# Patient Record
Sex: FEMALE | Race: WHITE | NOT HISPANIC OR LATINO | URBAN - METROPOLITAN AREA
[De-identification: names, ages, dates, MRNs, and addresses within clinical notes are randomized per-mention and may not be internally consistent; named-entity substitution may affect disease eponyms.]

---

## 2018-10-26 ENCOUNTER — EMERGENCY (EMERGENCY)
Facility: HOSPITAL | Age: 63
LOS: 1 days | Discharge: HOME | End: 2018-10-26
Admitting: EMERGENCY MEDICINE
Payer: COMMERCIAL

## 2018-10-26 VITALS
TEMPERATURE: 98 F | OXYGEN SATURATION: 100 % | DIASTOLIC BLOOD PRESSURE: 73 MMHG | HEART RATE: 85 BPM | SYSTOLIC BLOOD PRESSURE: 144 MMHG | RESPIRATION RATE: 16 BRPM

## 2018-10-26 DIAGNOSIS — F20.9 SCHIZOPHRENIA, UNSPECIFIED: ICD-10-CM

## 2018-10-26 DIAGNOSIS — R69 ILLNESS, UNSPECIFIED: ICD-10-CM

## 2018-10-26 DIAGNOSIS — F41.1 GENERALIZED ANXIETY DISORDER: ICD-10-CM

## 2018-10-26 DIAGNOSIS — F43.20 ADJUSTMENT DISORDER, UNSPECIFIED: ICD-10-CM

## 2018-10-26 PROCEDURE — 99283 EMERGENCY DEPT VISIT LOW MDM: CPT

## 2018-10-26 PROCEDURE — 90792 PSYCH DIAG EVAL W/MED SRVCS: CPT

## 2018-10-26 NOTE — ED BEHAVIORAL HEALTH ASSESSMENT NOTE - SUMMARY
Patient is a 62 year old single retired non-caregiver  female currently residing in the [facility]. PPH JOSH & Schizophrenia. She has a history of multiple past inpatient admissions last in 2016. She has no history of suicide attempts, aggression, violence, legal issues or substance abuse problems. PMH includes osteoporosis and hypothyroidism. BIB cousin for depression. Patient is a 62 year old single retired non-caregiver  female currently residing in the [facility]. PPH JOSH & Schizophrenia. She has a history of multiple past inpatient admissions last in 2016 at [facility]. She has no history of suicide attempts, aggression, violence, legal issues or substance abuse problems. PMH includes osteoporosis and hypothyroidism. BIB cousin for medication evaluation.     Patient presents to the ER in the context of medication evaluation. Cousin and facility expressed concern because patient refused to wash her hair x 5 weeks until this past Monday and has been isolative. Patient recently changed assisted livings. Per past notes of psychosocial assessment "patient transferred from another facility where she resided for 1.9 years and had difficulty socializing initially."  She did comply with facility request to wash her hair and today presents well groomed and clean. Patient has a linear organized thought process and does not appear acutely psychotic, manic or depressed. Patient denies SI/HI/SIB/intent/plan or any other mental health issues. She is medication compliant and lives in a supervised residence. She is eating and sleeping. She is future oriented and able to safety plan. She refused inpatient psychiatric admission and does not meet criteria for involuntary inpatient admission. Patient's behavior possibly related to adjustment of new residence or chronic negative symptoms which are commonly associated with schizophrenia and have limited potential for improvement. Recommend discharge back to facility and follow up with outpatient provider. Family can consider obtaining aide for patient to help encouragement of ADLs and participation in the community. Extensive safety planning performed. Patient and family agreeing verbally to return patient to ER or call 911 if symptoms worsen or patient has urges to harm self or others.

## 2018-10-26 NOTE — ED PROVIDER NOTE - CARE PLAN
Principal Discharge DX:	Adjustment disorder  Secondary Diagnosis:	Schizophrenia  Secondary Diagnosis:	JOSH (generalized anxiety disorder)

## 2018-10-26 NOTE — ED BEHAVIORAL HEALTH ASSESSMENT NOTE - CURRENT MEDICATION
Haldol Dec 200mg IM given 10/16/18  Cogentin 0.5mg BID  Celexa 20mg daily  Synthroid 25mcg daily  fosamax 70mg/week Haldol Dec 200mg IM given 10/16/18  Cogentin 0.5mg BID  Celexa (dose unknown) daily  Synthroid 25mcg daily  fosamax 70mg/week

## 2018-10-26 NOTE — ED BEHAVIORAL HEALTH ASSESSMENT NOTE - OTHER
Caroline,  family and facility peers lives in supervised living facility chronic mental illness recent change in living facility

## 2018-10-26 NOTE — ED PROVIDER NOTE - OBJECTIVE STATEMENT
63 y/o F  hx HTN,HLD, Depression presents to  ER w c/o intermittent anxiousness, requesting change in medication regimen.  Denies falling, punching or kicking any objects. Denies pain, SOB, fever, chills, chest/ abdominal discomfort.  Denies HI/AH/VH. Denies recent use of alcohol or illicit drugs. Admits to medication compliance. 63 y/o F  hx  JOSH, Schizophrenia, Osteoporosis, Hypothyroidism,  Depression presents to  ER w c/o intermittent anxiousness, requesting change in medication regimen. Admits to felling sad frequently, stays in her room and not wanting  to mingle with other resident at her facility.   Denies falling, punching or kicking any objects. Denies pain, SOB, fever, chills, chest/ abdominal discomfort.  Denies SI/HI/AH/VH. Denies recent use of alcohol or illicit drugs. Admits to medication compliance.

## 2018-10-26 NOTE — ED BEHAVIORAL HEALTH ASSESSMENT NOTE - OTHER PAST PSYCHIATRIC HISTORY (INCLUDE DETAILS REGARDING ONSET, COURSE OF ILLNESS, INPATIENT/OUTPATIENT TREATMENT)
PPH JOSH & Schizophrenia. She has a history of multiple past inpatient admissions last in 2016. She has no history of suicide attempts. Was at [facility] from 11/21/16-10/17/18. Currently sees facility psychiatrist Dr. Hernandez ([phone]).    Per past psychosocial history- "patient transferred from another facility where she resided for 1.9 years and had difficulty socializing initially." PPH JOSH & Schizophrenia. She has a history of multiple past inpatient admissions last in 2016 at [facility]. She has no history of suicide attempts. Was at [facility] from 11/21/16-10/17/18. Currently sees facility psychiatrist Dr. Hernandez ([phone]).    Per past psychosocial history- "patient transferred from another facility where she resided for 1.9 years and had difficulty socializing initially."

## 2018-10-26 NOTE — ED ADULT NURSE REASSESSMENT NOTE - NS ED NURSE REASSESS COMMENT FT1
Break Coverage - Pt calm & cooperative denies Si/Hi/AVh presently safety & comfort measures maintained eval on going.

## 2018-10-26 NOTE — ED ADULT NURSE NOTE - NSIMPLEMENTINTERV_GEN_ALL_ED
Implemented All Universal Safety Interventions:  [place] to call system. Call bell, personal items and telephone within reach. Instruct patient to call for assistance. Room bathroom lighting operational. Non-slip footwear when patient is off stretcher. Physically safe environment: no spills, clutter or unnecessary equipment. Stretcher in lowest position, wheels locked, appropriate side rails in place.

## 2018-10-26 NOTE — ED BEHAVIORAL HEALTH ASSESSMENT NOTE - SUICIDE PROTECTIVE FACTORS
Future oriented/Other/Identifies reasons for living/Responsibility to family and others/Supportive social network or family/High frustration tolerance/Positive therapeutic relationships/Ability to cope with stress

## 2018-10-26 NOTE — ED BEHAVIORAL HEALTH NOTE - BEHAVIORAL HEALTH NOTE
Called the [facility] in [place] ([phone]) and spoke Anum Gee-  Patient is currently on Haldol and gets a shot 1x a month and sees a psychiatrist. Patient has past psychiatric behaviors. Patient's niece and psychiatric physician thought it would be a good idea for her to be in a psychiatric facility. Patient is not participating in the program and would only come to meals. Patient would not leave her room and refuses to open the door. Patient has been withdrawn and is not bathing or washing her hair. Patient's hair was currently washed because they told her to stay in the facility they told her she had to wash her hair.  It took them 5 weeks to get her to do that. Patient needs more evaluation. Patient has not made any suicidal or homicidal threats. Patient has not been aggressive or violent. Patient has not endorsed AH/VH but speaking to her "she has a little bit of paranoia" because she said when they wanted her to wash her hair they said the sockets are not working and are "going to blow." Called the [facility] in [place] ([phone]) and spoke Anum Gee-  Patient is currently on Haldol and gets a shot 1x a month and sees a psychiatrist. Patient has past psychiatric behaviors. Patient's niece and psychiatric physician thought it would be a good idea for her to be in a psychiatric facility. Patient is not participating in the program and would only come to meals. Patient would not leave her room and refuses to open the door. Patient has been withdrawn and is not bathing or washing her hair. Patient's hair was currently washed because they told her to stay in the facility they told her she had to wash her hair.  It took them 5 weeks to get her to do that. Patient needs more evaluation. Patient has not made any suicidal or homicidal threats. Patient has not been aggressive or violent. Patient has not endorsed AH/VH but speaking to her "she has a little bit of paranoia" because she said when they wanted her to wash her hair they said the sockets are not working and are "going to blow."    Spoke with Dr. Hernandez ([phone])- Patient has a psychiatric illness and she has been hospitalized at [facility] and was court ordered Haldol Dec 200mg IM. Since then she did well but then patient came to the [facility] and patient refused to take a shower for a month and just sit her in he room. Patient was given Celexa but ti was unhelpful. Patient did take shower on Monday because she is not demented and knew she had to show she had improved and took a shower Monday. He said it is all superficial. Yesterday patient refused to open the door and was just sitting in her room. It was thought patient should go back to [facility]. Called the [facility] in [place] ([phone]) and spoke Anum Gee-  Patient is currently on Haldol and gets a shot 1x a month and sees a psychiatrist. Patient has past psychiatric behaviors. Patient's niece and psychiatric physician thought it would be a good idea for her to be in a psychiatric facility. Patient is not participating in the program and would only come to meals. Patient would not leave her room and refuses to open the door. Patient has been withdrawn and is not bathing or washing her hair. Patient's hair was currently washed because they told her to stay in the facility they told her she had to wash her hair.  It took them 5 weeks to get her to do that. Patient needs more evaluation. Patient has not made any suicidal or homicidal threats. Patient has not been aggressive or violent. Patient has not endorsed AH/VH but speaking to her "she has a little bit of paranoia" because she said when they wanted her to wash her hair they said the sockets are not working and are "going to blow."    Spoke with Dr. Hernandez ([phone])- Patient has a psychiatric illness and she has been hospitalized at [facility] and was court ordered Haldol Dec 200mg IM. Since then she did well but then patient came to the [facility] and patient refused to take a shower for a month and just sit her in he room. Patient was "catatonic and mute" before her injection but she got her Haldol dec injection and now is not catatonic and mute. He reports patient still would not shower until Monday and yesterday refused to open the door. He started her on celexa thinking maybe she is depressed but it was not helpful. Patient did take shower on Monday because she is not demented and knew she had to show she had improved and took a shower Monday. He said it is all superficial. Yesterday patient refused to open the door and was just sitting in her room.  It was thought patient should go back to [facility]. Called the [facility] in [place] ([phone]) and spoke  Anum Gee-  Patient is currently on Haldol and gets a shot 1x a month and sees a psychiatrist. Patient has past psychiatric behaviors. Patient's niece and psychiatric physician thought it would be a good idea for her to be in a psychiatric facility. Patient is not participating in the program and would only come to meals. Patient would not leave her room and refuses to open the door. Patient has been withdrawn and is not bathing or washing her hair. Patient's hair was currently washed because they told her to stay in the facility they told her she had to wash her hair.  It took them 5 weeks to get her to do that. Patient needs more evaluation. Patient has not made any suicidal or homicidal threats. Patient has not been aggressive or violent. Patient has not endorsed AH/VH but speaking to her "she has a little bit of paranoia" because she said when they wanted her to wash her hair they said the sockets are not working and are "going to blow."    Spoke with Dr. Hernandez ([phone])- Patient has a psychiatric illness and she has been hospitalized at [facility] and was court ordered Haldol Dec 200mg IM. Since then she did well but then patient came to the [facility] and patient refused to take a shower for a month and just sit her in he room. Patient was "catatonic and mute" before her injection but she got her Haldol dec injection and now is not catatonic and mute. He reports patient still would not shower until Monday and yesterday refused to open the door. He started her on celexa thinking maybe she is depressed but it was not helpful. Patient did take shower on Monday because she is not demented and knew she had to show she had improved and took a shower Monday but it was superficial. Yesterday patient refused to open the door and was just sitting in her room.  It was thought patient should go back to [facility]. Called the [facility] in [place] ([phone]) and spoke  Anum Gee-  Patient is currently on Haldol and gets a shot 1x a month and sees a psychiatrist. Patient has past psychiatric behaviors. Patient's niece and psychiatric physician thought it would be a good idea for her to be in a psychiatric facility. Patient is not participating in the program and would only come to meals. Patient would not leave her room and refuses to open the door. Patient has been withdrawn and refused to wash her hair x 5 weeks. Patient's hair was currently washed because they told her in order  to stay in the facility they told her she had to wash her hair and she complied. Patient needs more evaluation. Patient has not made any suicidal or homicidal threats. Patient has not been aggressive or violent. Patient has not endorsed AH/VH but speaking to her "she has a little bit of paranoia" because she said when they wanted her to wash her hair they said the sockets are not working.    Spoke with Dr. Hernandez ([phone])- Patient has a psychiatric illness and she has been hospitalized at [facility] and was court ordered Haldol Dec 200mg IM. Since then she did well but then patient came to the [facility] and patient refused to take a shower for a month and just sit her in he room. Patient was "catatonic and mute" before her injection but she got her Haldol dec injection and now is not catatonic and mute. He reports patient still would not shower until Monday and yesterday refused to open the door. He started her on celexa thinking maybe she is depressed but it was not helpful. Patient did take shower on Monday because she is not demented and knew she had to show she had improved and took a shower Monday but it was superficial. Yesterday patient refused to open the door and was just sitting in her room.  It was thought patient should go back to [facility]. Called the [facility] in [place] ([phone]) and spoke  Anum Gee-  Patient is currently on Haldol and gets a shot 1x a month and sees a psychiatrist. Patient has past psychiatric behaviors. Patient's niece and psychiatric physician thought it would be a good idea for her to be in a psychiatric facility. Patient is not participating in the program and would only come to meals. Patient would not leave her room and refused yesterday to open the door. Patient has been withdrawn and refused to wash her hair x 5 weeks. Patient's hair was currently washed because they told her in order  to stay in the facility she had to wash her hair and she did. Patient has not made any suicidal or homicidal threats. Patient has not been aggressive or violent. Patient has not endorsed AH/VH but speaking to her "she has a little bit of paranoia" because she said when they wanted her to wash and blow dry her hair that the electrical sockets are not working.    Spoke with Dr. Hernandez ([phone])- Patient has a psychiatric illness and she has been hospitalized at [facility] and was court ordered Haldol Dec 200mg IM. Since then she did well but then patient came to the [facility] and patient refused to wash her hair for a month and just sit her in he room. Patient was "catatonic and mute" before her injection but she got her Haldol dec injection and "now is not catatonic and mute". He reports patient still would not shower until Monday and yesterday refused to open the door. He started her on celexa thinking maybe she is depressed but it was not helpful. Patient did take shower on Monday because "she is not demented and knew she had to show she had improved" and took a shower Monday but it was superficial. Yesterday patient refused to open the door and was just sitting in her room.  It was thought patient should go back to [facility].

## 2018-10-26 NOTE — ED ADULT TRIAGE NOTE - CHIEF COMPLAINT QUOTE
Pt saw her pyschiatrist yesterday and was sent to ED for evaluation of medical regimen d/t Pt is increasingly removed from others.  Pt denies any symptoms denies SI HI hallucinations confusion hearing voices. Information provided by cousin (POA HCP) Yessenia Ibanez - Pt has been agitated and aggressive despite new medical regimen.

## 2018-10-26 NOTE — ED PROVIDER NOTE - MEDICAL DECISION MAKING DETAILS
61 y/o F  hx  JOSH, Schizophrenia, Osteoporosis, Hypothyroidism  Medical evaluation performed. There is no clinical evidence of intoxication or any acute medical problem requiring immediate intervention. Patient is awaiting psychiatric consultation. Final disposition will be determined by psychiatrist. Recommend following up with [facility].

## 2018-10-26 NOTE — ED ADULT NURSE REASSESSMENT NOTE - NS ED NURSE REASSESS COMMENT FT1
pt d/c by NP verbalizing understanding of d/c instructions pt denies Si/Hi/AVh presently resources provided by NP.

## 2018-10-26 NOTE — ED BEHAVIORAL HEALTH NOTE - BEHAVIORAL HEALTH NOTE
Worker spoke to patient’s cousin Angela Ibanez ([phone]) for collateral information. All information is as per Angela:    Patient is a 62 year old female, domiciled at [facility] for 2 months, retired, a diagnosis of paranoid schizophrenia, BIB accompanied by cousin.  According to patient’s cousin, the patient was last hospitalized in October 2016 at [facility] for being non- compliant with medication. Angela reports that the patient has been hospitalized about 4 times since 2010. She states that two months ago the patient recently made the move to [facility] to [facility] by the recommendation of the patient’s father. The patient’s father who is 95 years old also lives in the [facility] with the patient. Angela states that the patient was seeing Dr. Garrett in February and the patient’s Haldol injection dosage was reduced to 200mg to 150mg. Angela states that when this change occurred she stopped going to activities at [facility]. Now the patient is seeing Dr. Conklin at [facility] ([phone]) and she is taking Haldol 200mg IM. The patient’s last injection was 10 days ago. Angela states she is concerned because the patient stopped washing her hair for the past 4 weeks. On Monday, Angela states when she visited the patient at [facility] she was not answering her phone or answering her door and the director had to open her room door. The director informed the patient if she was not able to not take care of her hygiene she was not able to continue her stay there. Angela states that the patient washed her hair on Monday because she was scared to go to the hospital. Angela states the patient has been eating fine but has been restless. Angela states that the patient has recently been getting abrupt with her and she refuses to follow up with tests for her mammogram or colonoscopy. The patient also sees a  [facility] ([phone]) who as per Angela, she thinks the patient is incoherent. Angela states that she is not sure if the patient is having AH/VH but at times when she talks to the patient she has a blank stare. The patient has no medical problems or allergies but is taking Synthroid for her thyroid issues. Angela states that the patient has been compliant with her medications because [facility] administrates the medication. The patient is also taking Cogentin and Celexa (unknown dosages). Angela states that she took the patient to [facility] but the facility had no beds and was no longer taking walk-ins.  Patient will be discharge and return back to [facility].

## 2018-10-26 NOTE — ED BEHAVIORAL HEALTH ASSESSMENT NOTE - CASE SUMMARY
Patient is a 62 year old single retired non-caregiver  female currently residing in the [facility]. PPH JOSH & Schizophrenia. She has a history of multiple past inpatient admissions last in 2016 at [facility]. She has no history of suicide attempts, aggression, violence, legal issues or substance abuse problems. PMH includes osteoporosis and hypothyroidism. BIB cousin for medication evaluation.     Patient presents to the ER in the context of medication evaluation. Cousin and facility expressed concern because patient refused to wash her hair x 5 weeks until this past Monday and has been isolative. Patient recently changed assisted livings. Per past notes of psychosocial assessment "patient transferred from another facility where she resided for 1.9 years and had difficulty socializing initially."  She did comply with facility request to wash her hair and today presents well groomed and clean. Patient has a linear organized thought process and does not appear acutely psychotic, manic or depressed. Patient denies SI/HI/SIB/intent/plan or any other mental health issues. She is medication compliant and lives in a supervised residence. She is eating and sleeping. She is future oriented and able to safety plan. She refused inpatient psychiatric admission and does not meet criteria for involuntary inpatient admission. Patient's behavior possibly related to adjustment of new residence or chronic negative symptoms which are commonly associated with schizophrenia and have limited potential for improvement. Recommend discharge back to facility and follow up with outpatient provider.

## 2018-10-26 NOTE — ED BEHAVIORAL HEALTH ASSESSMENT NOTE - HPI (INCLUDE ILLNESS QUALITY, SEVERITY, DURATION, TIMING, CONTEXT, MODIFYING FACTORS, ASSOCIATED SIGNS AND SYMPTOMS)
Patient is a 62 year old single retired non-caregiver  female currently residing in the [facility]. PPH JOSH & Schizophrenia. She has a history of multiple past inpatient admissions last in 2016. She has no history of suicide attempts, aggression, violence, legal issues or substance abuse problems. PMH includes osteoporosis and hypothyroidism. BIB cousin for depression.    Patient reports she is unsure why she came to the ER she stated the facility and her cousin wanted her medication looked at. Patient appeared to be a poor historian. She reported she recently moved facilities in Fall because "this one is nicer." She stated she has been taking her medications and doing fine. She was confronted regarding not washing her hair and refusing to participate in program and patient stated she didn't wash her hair because "it didn't need to be washed," and that "I do things in the facility, I like to water color."    Patient denies any hallucinations, does not report any delusional thought content, denies thought insertion/withdrawal, denies referential thought processes & is not paranoid on interview. Patient does not report nor exhibit any signs of barry, including irritable or elevated mood, grandiosity, pressured speech, risk-taking behaviors, increase in productivity or agitation. Patient denies any depressive symptoms including depressed mood, anhedonia, changes in energy/concentration/appetite, sleep disturbances, preoccupation with death or feelings of guilt. Patient adamantly denies SI, intent or plan; denies any HI, violent thoughts.     See  note for collateral information. Patient is a 62 year old single retired non-caregiver  female currently residing in the [facility]. PPH JOSH & Schizophrenia. She has a history of multiple past inpatient admissions last in 2016 at [facility]. She has no history of suicide attempts, aggression, violence, legal issues or substance abuse problems. PMH includes osteoporosis and hypothyroidism. BIB cousin for medication evaluation.     Patient reports she is unsure why she came to the ER. She stated the facility and her cousin wanted her medication looked at. She reported she recently moved in Fall because "this place is nicer." She stated she has been taking her medications and doing "fine". She was confronted regarding not washing her hair and refusing to participate in program and patient stated she didn't wash her hair because "it didn't need to be washed," and that "I do things in the facility, I like to water color." She stated she washed her hair Monday. She was questioned regarding the outlets at the facility and she said it is hard to get things into them because they are new and its hard to push things in.     Patient denies any hallucinations, does not report any delusional thought content, denies thought insertion/withdrawal, denies referential thought processes & is not paranoid on interview. Patient does not report nor exhibit any signs of barry, including irritable or elevated mood, grandiosity, pressured speech, risk-taking behaviors, increase in productivity or agitation. Patient denies any depressive symptoms including depressed mood, anhedonia, changes in energy/concentration/appetite, sleep disturbances, preoccupation with death or feelings of guilt. Patient adamantly denies SI, intent or plan; denies any HI, violent thoughts.     See  notes for collateral information.

## 2018-10-26 NOTE — ED BEHAVIORAL HEALTH ASSESSMENT NOTE - DESCRIPTION
osteoporosis, hypothyroidism see hpi During course of ED visit patient was calm and cooperative. Patient was not aggressive or violent and did not require PRN medications.    Vital Signs Last 24 Hrs  T(C): 36.4 (26 Oct 2018 16:37), Max: 36.4 (26 Oct 2018 16:37)  T(F): 97.5 (26 Oct 2018 16:37), Max: 97.5 (26 Oct 2018 16:37)  HR: 85 (26 Oct 2018 16:37) (85 - 85)  BP: 144/73 (26 Oct 2018 16:37) (144/73 - 144/73)  BP(mean): --  RR: 16 (26 Oct 2018 16:37) (16 - 16)  SpO2: 100% (26 Oct 2018 16:37) (100% - 100%)

## 2024-03-08 PROBLEM — F20.9 SCHIZOPHRENIA, UNSPECIFIED: Chronic | Status: ACTIVE | Noted: 2018-11-10

## 2024-03-08 PROBLEM — E03.9 HYPOTHYROIDISM, UNSPECIFIED: Chronic | Status: ACTIVE | Noted: 2018-11-10

## 2024-03-08 PROBLEM — F41.1 GENERALIZED ANXIETY DISORDER: Chronic | Status: ACTIVE | Noted: 2018-11-10

## 2024-03-08 PROBLEM — M81.0 AGE-RELATED OSTEOPOROSIS WITHOUT CURRENT PATHOLOGICAL FRACTURE: Chronic | Status: ACTIVE | Noted: 2018-11-10

## 2024-03-27 PROBLEM — Z00.00 ENCOUNTER FOR PREVENTIVE HEALTH EXAMINATION: Status: ACTIVE | Noted: 2024-03-27

## 2024-03-28 ENCOUNTER — APPOINTMENT (OUTPATIENT)
Dept: PSYCHIATRY | Facility: CLINIC | Age: 69
End: 2024-03-28
Payer: MEDICARE

## 2024-03-28 DIAGNOSIS — Z87.39 PERSONAL HISTORY OF OTHER DISEASES OF THE MUSCULOSKELETAL SYSTEM AND CONNECTIVE TISSUE: ICD-10-CM

## 2024-03-28 DIAGNOSIS — Z86.39 PERSONAL HISTORY OF OTHER ENDOCRINE, NUTRITIONAL AND METABOLIC DISEASE: ICD-10-CM

## 2024-03-28 PROCEDURE — 99205 OFFICE O/P NEW HI 60 MIN: CPT

## 2024-03-28 RX ORDER — MULTIVIT-MIN/FOLIC/VIT K/LYCOP 400-300MCG
50 MCG TABLET ORAL
Refills: 0 | Status: ACTIVE | COMMUNITY

## 2024-03-28 RX ORDER — ARIPIPRAZOLE 10 MG/1
10 TABLET ORAL
Refills: 0 | Status: ACTIVE | COMMUNITY

## 2024-03-28 RX ORDER — MIRTAZAPINE 30 MG/1
30 TABLET, FILM COATED ORAL
Refills: 0 | Status: ACTIVE | COMMUNITY

## 2024-03-28 RX ORDER — HALOPERIDOL DECANOATE 100 MG/ML
100 INJECTION INTRAMUSCULAR
Refills: 0 | Status: ACTIVE | COMMUNITY

## 2024-03-28 RX ORDER — CITALOPRAM HYDROBROMIDE 20 MG/1
20 TABLET, FILM COATED ORAL
Refills: 0 | Status: ACTIVE | COMMUNITY

## 2024-03-28 RX ORDER — ALENDRONATE SODIUM 70 MG/1
70 TABLET ORAL
Refills: 0 | Status: ACTIVE | COMMUNITY

## 2024-03-28 RX ORDER — CHLORHEXIDINE GLUCONATE 4 %
1000 LIQUID (ML) TOPICAL
Refills: 0 | Status: ACTIVE | COMMUNITY

## 2024-03-28 RX ORDER — BENZTROPINE MESYLATE 0.5 MG/1
0.5 TABLET ORAL
Refills: 0 | Status: ACTIVE | COMMUNITY

## 2024-03-28 RX ORDER — LEVOTHYROXINE SODIUM 0.03 MG/1
25 TABLET ORAL
Refills: 0 | Status: ACTIVE | COMMUNITY

## 2024-03-28 RX ORDER — CARBOXYMETHYLCELLULOSE SODIUM 5 MG/ML
0.5 SOLUTION/ DROPS OPHTHALMIC
Refills: 0 | Status: ACTIVE | COMMUNITY

## 2024-03-28 RX ORDER — HALOPERIDOL 5 MG/1
5 TABLET ORAL
Refills: 0 | Status: ACTIVE | COMMUNITY

## 2024-03-28 NOTE — PHYSICAL EXAM
[Average] : average [Cooperative] : cooperative [Euthymic] : euthymic [Clear] : clear [Linear/Goal Directed] : linear/goal directed [WNL] : within normal limits [FreeTextEntry4] : hands- bilateral resting tremor  [Well groomed] : well groomed [Constricted] : constricted [None Reported] : none reported [FreeTextEntry5] : polite [FreeTextEntry7] : devoid of suicidal, homicidal, paranoid or delusional content  [de-identified] : Does not appear to be responding to internal stimuli

## 2024-03-28 NOTE — DISCUSSION/SUMMARY
[Not clinically indicated] : Safety Plan completed/updated (for individuals at risk): Not clinically indicated [FreeTextEntry1] : 3/28/24- Evaluation started, request ALLISON for Angela lewis ([phone]), staff at [facility], former psychiatrist, CANDY Almaraz. Pending ALLISON, obtain collateral. Continue evaluation at next appointment.   Important Contacts: Angela Ibanez (flosin, POA, HCP)- [phone] Shiloh Jauregui ( of Nursing at [facility]) [phone] Akosua, Jennlast name (Director of Wellness at [facility])- [phone] *Cesilia retains her own guardianship.  [Unable to determine level of acute suicide risk] : Unable to determine level of acute suicide risk [Unable to determine] : Unable to determine

## 2024-03-28 NOTE — SOCIAL HISTORY
[FreeTextEntry1] : -[place]: [place], [place] -Current living situation: assisted living at [facility] in [place]--living there for 7.5 years.  -Parents/Siblings/Childhood: parents (), older brother ( from heart failure), recalls it being a happy childhood -Highest Level of Education: Graduated from , did some college  -Work History: [employer] work about 7 years ago -Romantic relationships: single, never  -Children: denied  -History of Trauma: denied  -Legal History: Patient denies recent or remote hx of legal problems. -Access to firearms: patient denies.

## 2024-03-28 NOTE — REVIEW OF SYSTEMS
[FreeTextEntry2] : unintentional weight loss- 15 to 20 lbs over 1-2 months [de-identified] : as per hpi

## 2024-03-28 NOTE — HISTORY OF PRESENT ILLNESS
[FreeTextEntry1] : 69 yo F with history of schizophrenia currently living at [facility] presenting for intake evaluation for psych med management. Patient arrived accompanied by her cousin Angela Ibanez ([phone]).   Patient has been treated by Dr. Shubham Heranndez, based out of [place] since 2018. Patient and her cousin are seeking re-evaluation of patient's treatment plan and potential transfer of care. Patient arrived with list of medications from [place] which have been recorded in the medical record and below. Patient had limited capacity to recall historical details so additional history was obtained from cousin, Angela. Angela also provided contact information for staff at [place] who can provide additional details.   Per patient, she has been concerned about recent unintentional weight loss of ~15-20 lb over relative short time period (she estimated about 1-2 months). Patient said she has had little appetite recently but reported she has been eating three meals a day without snacks in between. Per patient, food is prepared at [place]- patient is not responsible for grocery shopping/cooking.    Per interview with both patient herself and Angela, Cesilia was diagnosed with schizophrenia in 2010 (age 54). At the time of her first psychotic episode, she was living in a family home with her father and had stopped responding to family, was not eating or taking any medication. Fire department had to break down the door, was hospitalized involuntarily at [facility]. She was hospitalized on two subsequent occasions in 2014 and 2016 in the setting of medication noncompliance, both times at [facility]. Following the most recent hospitalization in 2016, she was prescribed Haldol Dec and moved from her family home to the [place]. Was initially receiving Haldol Dec at [facility] until transferring to the [facility] location which provides enhanced nursing and has been able to administer both her daily medications as well as the Haldol Dec monthly.   Per patient, she believes the medications she takes "are working for [her]" is unsure what they help with. She did endorse a history of depressive episodes but cannot recall details about the approximate dates or context. She denied history of barry and does not recall a history of AH/VH/delusions/paranoia/ideas of reference. She did endorse periods of time where she felt particularly confused and like her thoughts did not make sense, but cannot recall details. She was not able to recall details about hospitalizations or circumstances around them, but was in agreement with the details Angela provided. She denied side effects from medications and could not recall what benztropine was prescribed for, though per Angela it was prescribed for tremor. Cesilia said she is most concerned about the recent weight loss but otherwise feels she is doing well. She described her mood as "happy" and reported her sleep is good. She denied hopelessness. She denied history of past or present SI and HI.    [FreeTextEntry2] : -Past or Current Mental Health Providers: Has been seeing Dr. Shubham Hernandez, located in [place], [place] since 2018.  -Historical Diagnoses: schizophrenia  -Prior Psychiatric Hospitalizations: lifetime- 3, most recent October 2016. Previous in 2014 and 2010. All three hospitalizations at [facility]. Hospitalizations in 2014 and 2016 were in setting of medication noncompliance.  -Prior Suicidal Ideation/Attempts: denied -History of self-injurious behavior: denied -History of assaultive or violent behavior: denied   Substance Use History: -Nicotine: nonsmoker  -Alcohol: denied past and present -Cannabis: denied  -Caffeine: 1 cup of coffee and tea  -Other Illicit drugs: denied  -Medical/social/legal problems related to substance use: denied  -Prior Rehab Tx: denied    [FreeTextEntry3] : Current psych meds: -Aripiprazole 10 mg qHS (for about 6 years) -Benztropine 0.5 mg BID (not sure how long or why) -Citalopram 20 mg daily (6 years) -Haloperidol dec 200 mg every 4 weeks -Haloperidol 5 mg PO orally  -Mirtazapine 30 mg qHS   Past psych meds: she cannot recall details   PDMP reviewed, ref# [id number]. No record of CS prescriptions in any state.

## 2024-03-28 NOTE — PLAN
[FreeTextEntry4] : 1. Schizophrenia  --Continue evaluation at next appointment --Request ROIs and obtain collateral from the following: -----Angela Ibanez (cousin)- [phone] -----Shiloh Jauregui ( of Nursing at [facility]) [phone] -----Akosua, ?last name (Director of Wellness at [facility])- [phone] -----[facility]  -----Dr. Shubham Hernandez ([phone]) --Request hospital discharge summaries from 2010, 2014 and 2016 hospitalizations at [facility]    - I have given my usual talk about the risks and benefits of all treatment recommendations including alternatives and the risks and benefits of no treatment at all. Patient had the opportunity to have all questions answered to their satisfaction. They expressed understanding and agreement with the above treatment plan. - Educated patient of importance of remaining abstinent from drugs and alcohol. - Discussed that unpredictable effects including cardiorespiratory collapse can result from the combination of illicit drugs and prescribed medications. Patient verbalized understanding. - Emergency procedures were discussed: pt. educated to call 911 or go to nearest ER for worsening of symptoms/suicidal/homicidal ideation. - Patient given opportunity to ask questions and their questions were answered and they expressed understanding and agreement with above plan.   I spent a total of 60 minutes for today's visit in evaluating and treating the patient as per above, including preparing for patient's appointment (review of prior documents, [facility] ), performing a medically necessary exam/evaluation, communicating/counseling/educating the patient ordering medications, documenting clinical information in the EHR.

## 2024-03-28 NOTE — FAMILY HISTORY
[FreeTextEntry1] :  Psychiatric family history: -Suicide: denied -Mood disorders: denied -Psychotic disorders: brother had schizophrenia -Substance use disorders: denied

## 2024-04-30 ENCOUNTER — APPOINTMENT (OUTPATIENT)
Dept: PSYCHIATRY | Facility: CLINIC | Age: 69
End: 2024-04-30
Payer: MEDICARE

## 2024-04-30 DIAGNOSIS — F20.9 SCHIZOPHRENIA, UNSPECIFIED: ICD-10-CM

## 2024-04-30 PROCEDURE — 99214 OFFICE O/P EST MOD 30 MIN: CPT

## 2024-04-30 NOTE — RISK ASSESSMENT
[No, patient denies ideation or behavior] : No, patient denies ideation or behavior [Unable to determine] : Unable to determine [Not clinically indicated] : Safety Plan completed/updated (for individuals at risk): Not clinically indicated [Low acute suicide risk] : Low acute suicide risk [FreeTextEntry2] : not acutely elevated based on clinical interview and collateral obtained, however little information available about patient's prior hospitalizations

## 2024-04-30 NOTE — SOCIAL HISTORY
[FreeTextEntry1] : Refer to intake documentation from 3/28/24 for details. Updates as per interval history.

## 2024-04-30 NOTE — PLAN
[FreeTextEntry4] : 1. Schizophrenia  --Recommend patient continue psych med management with current provider who is embedded in patient's living facility and thus in a better position to coordinate with other staff/nursing. Patient also receiving haldol decanoate which we are unable to provide in our office.  --Discussed with patient and her cousin some potential medication changes to discuss with Dr. Hernandez (including possible tapering citalopram and aripiprazole), based on my conversation with him. They understand that ultimately prescribing decisions are at the discretion of Dr. Hernandez.  --Recommend they discuss concerns about TD and potential treatments with Dr Hernandez  --No further follow-up required, they understand treatment will be continued through their current psychiatrist  - I have given my usual talk about the risks and benefits of all treatment recommendations including alternatives and the risks and benefits of no treatment at all. Patient had the opportunity to have all questions answered to their satisfaction. They expressed understanding and agreement with the above treatment plan. - Educated patient of importance of remaining abstinent from drugs and alcohol. - Discussed that unpredictable effects including cardiorespiratory collapse can result from the combination of illicit drugs and prescribed medications. Patient verbalized understanding. - Emergency procedures were discussed: pt. educated to call 911 or go to nearest ER for worsening of symptoms/suicidal/homicidal ideation. - Patient given opportunity to ask questions and their questions were answered and they expressed understanding and agreement with above plan.  I spent a total of 30 minutes for today's visit in evaluating and treating the patient as per above, including preparing for patient's appointment (review of prior documents, [facility] ), performing a medically necessary exam/evaluation, communicating/counseling/educating the patient ordering medications, documenting clinical information in the EHR.

## 2024-04-30 NOTE — DISCUSSION/SUMMARY
[Unable to determine level of acute suicide risk] : Unable to determine level of acute suicide risk [Unable to determine] : Unable to determine [Not clinically indicated] : Safety Plan completed/updated (for individuals at risk): Not clinically indicated [FreeTextEntry1] : 3/28/24- Evaluation started, request ALLISON for Angela lewis ([phone]), staff at [facility], former psychiatrist, CANDY Almaraz. Pending ALLISON, obtain collateral. Continue evaluation at next appointment.  4/30/24- Recommend patient continue med management with current psychiatrist Dr. Hernandez at the [facility].   Important Contacts: Angela Ibanez (joshua, POA, HCP)- [phone] Shiloh Jauregui ( of Nursing at [facility]) [phone] Akosua, ?last name (Director of Wellness at [facility])- [phone] *Cesilia retains her own guardianship.

## 2024-04-30 NOTE — REVIEW OF SYSTEMS
[FreeTextEntry2] : unintentional weight loss- 15 to 20 lbs over 1-2 months [de-identified] : as per interval history

## 2024-04-30 NOTE — HISTORY OF PRESENT ILLNESS
[FreeTextEntry1] : Refer to intake documentation from 3/28/24 for details.  [FreeTextEntry2] : Refer to intake documentation from 3/28/24 for details.  [FreeTextEntry3] : Current psych meds: -Aripiprazole 10 mg qHS (for about 6 years) -Benztropine 0.5 mg BID (not sure how long or why) -Citalopram 20 mg daily (6 years) -Haloperidol dec 200 mg every 4 weeks -Haloperidol 5 mg PO orally  -Mirtazapine 30 mg qHS   Past psych meds: she cannot recall details   PDMP reviewed, ref# [id number]. No record of CS prescriptions in any state.

## 2024-04-30 NOTE — PHYSICAL EXAM
[Well groomed] : well groomed [Average] : average [Cooperative] : cooperative [Euthymic] : euthymic [Constricted] : constricted [Clear] : clear [Linear/Goal Directed] : linear/goal directed [None Reported] : none reported [WNL] : within normal limits [Dystonia] : dystonia [FreeTextEntry4] : hands- bilateral resting tremor, involuntary movements of tongue, lips  [FreeTextEntry5] : polite [FreeTextEntry7] : devoid of suicidal, homicidal, paranoid or delusional content  [de-identified] : Does not appear to be responding to internal stimuli